# Patient Record
(demographics unavailable — no encounter records)

---

## 2025-04-30 NOTE — DISCUSSION/SUMMARY
[FreeTextEntry1] : pt without any vaccine record school doesn't have any record  no vaccine record from [facility] pt says mother has been telling school that he is vaccinated but hasn't brought in any record from vaccines pt given VIS consent form for parent to sign with VIS info sheets  pt will return tomorrow if mother refuses to sign will advise school [facility] [employer] reviewed Anticipatory guidance given Schedule CPE

## 2025-04-30 NOTE — RISK ASSESSMENT
[Eats meals with family] : eats meals with family [Grade: ____] : Grade: [unfilled] [Normal Homework] : normal homework [Has friends] : has friends [Home is free of violence] : home is free of violence [Uses tobacco] : does not use tobacco [Uses drugs] : does not use drugs  [Drinks alcohol] : does not drink alcohol [Has had sexual intercourse] : has not had sexual intercourse [de-identified] : mother